# Patient Record
Sex: FEMALE | URBAN - METROPOLITAN AREA
[De-identification: names, ages, dates, MRNs, and addresses within clinical notes are randomized per-mention and may not be internally consistent; named-entity substitution may affect disease eponyms.]

---

## 2024-07-01 ENCOUNTER — TELEPHONE (OUTPATIENT)
Age: 74
End: 2024-07-01

## 2024-07-01 NOTE — TELEPHONE ENCOUNTER
I spoke to pt and will email records request to [email or web address]. She is not due until 2026 per Dr Maguire and wishes to find a provider closer to her home. She will forward records request back when she finds a new dr. Previous pt of Dr Maguire . Sb

## 2024-07-01 NOTE — TELEPHONE ENCOUNTER
Patients GI provider:  Dr. Maguire    Number to return call: [phone]    Reason for call: Pt calling to inquire when last colonoscopy was and when next is due. Transferred to [facility] at [facility] [address] clerical for further assistance.    Scheduled procedure/appointment date if applicable: N/A